# Patient Record
(demographics unavailable — no encounter records)

---

## 2025-03-07 NOTE — REVIEW OF SYSTEMS
[Confused or Disoriented] : confusion [Memory Lapses or Loss] : memory loss [Decr. Concentrating Ability] : decreased concentrating ability [Difficulty with Language] : ~M difficulty with language [Arm Weakness] : arm weakness [Hand Weakness] :  hand weakness [Leg Weakness] : leg weakness [Numbness] : numbness [Tingling] : tingling [Abnormal Sensation] : an abnormal sensation [Dizziness] : dizziness [Fainting] : fainting [Lightheadedness] : lightheadedness [Difficulty Walking] : difficulty walking [Changed Thought Patterns] : no change in thought patterns [Facial Weakness] : no facial weakness [Poor Coordination] : good coordination [Seizures] : no convulsions [Frequent Falls] : not falling [Anxiety] : no anxiety [Depression] : no depression [Eye Pain] : no eye pain [Eyesight Problems] : no eyesight problems [Earache] : no earache [Loss Of Hearing] : no hearing loss [Chest Pain] : no chest pain [Palpitations] : no palpitations [Shortness Of Breath] : no shortness of breath [Cough] : no cough [de-identified] : Forgetfulness, headache

## 2025-03-07 NOTE — REVIEW OF SYSTEMS
[Confused or Disoriented] : confusion [Memory Lapses or Loss] : memory loss [Decr. Concentrating Ability] : decreased concentrating ability [Difficulty with Language] : ~M difficulty with language [Arm Weakness] : arm weakness [Hand Weakness] :  hand weakness [Leg Weakness] : leg weakness [Numbness] : numbness [Tingling] : tingling [Abnormal Sensation] : an abnormal sensation [Dizziness] : dizziness [Fainting] : fainting [Lightheadedness] : lightheadedness [Difficulty Walking] : difficulty walking [Changed Thought Patterns] : no change in thought patterns [Facial Weakness] : no facial weakness [Poor Coordination] : good coordination [Seizures] : no convulsions [Frequent Falls] : not falling [Anxiety] : no anxiety [Depression] : no depression [Eye Pain] : no eye pain [Eyesight Problems] : no eyesight problems [Earache] : no earache [Loss Of Hearing] : no hearing loss [Chest Pain] : no chest pain [Palpitations] : no palpitations [Shortness Of Breath] : no shortness of breath [Cough] : no cough [de-identified] : Forgetfulness, headache

## 2025-03-07 NOTE — CONSULT LETTER
[Dear  ___] : Dear  [unfilled], [Consult Letter:] : I had the pleasure of evaluating your patient, [unfilled]. [Please see my note below.] : Please see my note below. [Consult Closing:] : Thank you very much for allowing me to participate in the care of this patient.  If you have any questions, please do not hesitate to contact me. [Sincerely,] : Sincerely, [DrNeeraj  ___] : Dr. ROJAS [FreeTextEntry3] : Sharla Wang NP\par  [facility] [place]\par  [address] [place] [address]\par  Phone: [phone]; Fax: [phone]
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
## 2025-03-07 NOTE — REASON FOR VISIT
[Follow-Up: _____] : a [unfilled] follow-up visit [Language Line ] : provided by Language [employer]   [Interpreters_IDNumber] : [id number] [Interpreters_FullName] : Ethan

## 2025-03-07 NOTE — PHYSICAL EXAM
[Total Score ___ / 30] : the patient achieved a score of [unfilled] /30 [Date / Time ___ / 5] : date / time [unfilled] / 5 [Place ___ / 5] : place [unfilled] / 5 [Registration ___ / 3] : registration [unfilled] / 3 [Serial Sevens ___/5] : serial sevens [unfilled] / 5 [Naming 2 Objects ___ / 2] : naming two objects [unfilled] / 2 [Repeating a Sentence ___ / 1] : repeating a sentence [unfilled] / 1 [Writing a Sentence ___ / 1] : write sentence [unfilled] / 1 [3-stage Verbal Command ___ / 3] : three-stage verbal command [unfilled] / 3 [Written Command ___ / 1] : written command [unfilled] / 1 [Copy a Design ___ / 1] : copy a design [unfilled] / 1 [Recall ___ / 3] : recall [unfilled] / 3 [FreeTextEntry1] : GENERAL PHYSICAL EXAM: GEN: no distress, normal affect HEENT: NCAT, OP clear EYES: sclera white, conjunctiva clear, no nystagmus NECK: supple CV: normal rhythm PULM: no respiratory distress, normal rhythm and effort EXT: no edema, no cyanosis MSK: muscle tone and strength normal SKIN: warm, dry, no rash or lesion on exposed skin   NEUROLOGICAL EXAM: Mental Status Orientation: alert and oriented to person, place, time, and situation Language: clear and fluent, intact comprehension and repetition, intact naming and reading  Cranial Nerves II: visual fields full to confrontation  III, IV, VI: PERRL, EOMI V, VII: facial sensation and movement intact and symmetric  VIII: hearing intact  IX, X: uvula midline, soft palate elevates normally  XI: BL shoulder shrug intact  XII: tongue midline  Motor Shoulder abd: 4 (R), 4 (L) EF/EE: 3+ (R), 3+ (L) hand : 4 (R), 4 (L) HF/HE: 4 (R), 4 (L) KF/KE: 4 (R), 4 (L) DF/PF: 4 (R), 4 (L)  Tone and bulk are normal in upper and lower limbs No pronator drift  Sensation Intact to light touch in all 4 EXTs  Reflex 2+ in BL biceps, brachioradialis, patella  Coordination Normal FTN bilaterally Dysdiadochokinesia not present.  Able to perform rapid, alternating movements  Gait Antalgic gait

## 2025-03-07 NOTE — REASON FOR VISIT
[Follow-Up: _____] : a [unfilled] follow-up visit [Language Line ] : provided by [employer]   [Interpreters_IDNumber] : [id number] [Interpreters_FullName] : Ethan
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
